# Patient Record
Sex: FEMALE | Race: OTHER | URBAN - METROPOLITAN AREA
[De-identification: names, ages, dates, MRNs, and addresses within clinical notes are randomized per-mention and may not be internally consistent; named-entity substitution may affect disease eponyms.]

---

## 2017-03-16 PROCEDURE — 87510 GARDNER VAG DNA DIR PROBE: CPT | Performed by: FAMILY MEDICINE

## 2017-03-16 PROCEDURE — 87660 TRICHOMONAS VAGIN DIR PROBE: CPT | Performed by: FAMILY MEDICINE

## 2017-03-16 PROCEDURE — 87086 URINE CULTURE/COLONY COUNT: CPT | Performed by: FAMILY MEDICINE

## 2017-03-16 PROCEDURE — 87480 CANDIDA DNA DIR PROBE: CPT | Performed by: FAMILY MEDICINE

## 2019-07-29 PROCEDURE — 88175 CYTOPATH C/V AUTO FLUID REDO: CPT | Performed by: FAMILY MEDICINE

## 2024-06-04 ENCOUNTER — TELEPHONE (OUTPATIENT)
Dept: SURGERY | Facility: CLINIC | Age: 29
End: 2024-06-04

## 2024-06-04 NOTE — TELEPHONE ENCOUNTER
Uploaded MRI Full Ext DOS 05/22/24, MRI SI Joints DOS 05/01/24, IR Fluid Local DOS 11/18/22, CT Abdomen DOS 11/01/22, MRI Spine Lumbar DOS 05/01/24, XR Lumbar Spine DOS 04/15/24, and IR Biopsy DOS 11/18/22 done at [facility]. Placed disc in the patient  drawer for patient .

## 2024-06-06 NOTE — TELEPHONE ENCOUNTER
Rec'd referral with imaging reports and ov notes. Pt has NP appt with Yu Brody on 6/20/2024. Endorsed to nurse's bin for provider review.

## 2024-06-07 NOTE — TELEPHONE ENCOUNTER
referral with imaging reports and ov notes   received by MA clinical staff, endorsed to provider for review. Placed on Yu's desk   Will be sent to scanning once received back from provider.

## 2024-06-14 NOTE — TELEPHONE ENCOUNTER
referral with imaging reports and ov notes  paperwork reviewed and signed by provider, paperwork was sent to scan

## 2024-06-20 ENCOUNTER — OFFICE VISIT (OUTPATIENT)
Dept: SURGERY | Facility: CLINIC | Age: 29
End: 2024-06-20

## 2024-06-20 VITALS
WEIGHT: 180 LBS | SYSTOLIC BLOOD PRESSURE: 124 MMHG | BODY MASS INDEX: 28.93 KG/M2 | DIASTOLIC BLOOD PRESSURE: 84 MMHG | HEART RATE: 90 BPM | HEIGHT: 66 IN

## 2024-06-20 DIAGNOSIS — M51.36 BULGE OF LUMBAR DISC WITHOUT MYELOPATHY: ICD-10-CM

## 2024-06-20 DIAGNOSIS — G57.01 LESION OF RIGHT SCIATIC NERVE: Primary | ICD-10-CM

## 2024-06-20 DIAGNOSIS — M51.36 DDD (DEGENERATIVE DISC DISEASE), LUMBAR: ICD-10-CM

## 2024-06-20 DIAGNOSIS — M79.651 PAIN IN BOTH THIGHS: ICD-10-CM

## 2024-06-20 DIAGNOSIS — M51.36 ANNULAR TEAR OF LUMBAR DISC: ICD-10-CM

## 2024-06-20 DIAGNOSIS — M79.652 PAIN IN BOTH THIGHS: ICD-10-CM

## 2024-06-20 PROCEDURE — 3074F SYST BP LT 130 MM HG: CPT | Performed by: PHYSICIAN ASSISTANT

## 2024-06-20 PROCEDURE — 3079F DIAST BP 80-89 MM HG: CPT | Performed by: PHYSICIAN ASSISTANT

## 2024-06-20 PROCEDURE — 99205 OFFICE O/P NEW HI 60 MIN: CPT | Performed by: PHYSICIAN ASSISTANT

## 2024-06-20 PROCEDURE — 3008F BODY MASS INDEX DOCD: CPT | Performed by: PHYSICIAN ASSISTANT

## 2024-06-20 RX ORDER — TRIAMCINOLONE ACETONIDE 1 MG/G
CREAM TOPICAL
COMMUNITY
Start: 2024-04-17

## 2024-06-20 RX ORDER — MULTIVIT-MIN/IRON/FOLIC ACID/K 18-600-40
CAPSULE ORAL AS DIRECTED
COMMUNITY

## 2024-06-20 NOTE — PROGRESS NOTES
New patient:  Reason for visit: Right side lower back. Numbness and tingling when back pain started.     Mass found sciatic nerve     Estimated time of onset:  first week of April     Numeric Rating Scale:    Pain at Present:  2/10                                                                                                                       Distribution of Pain:    right    Past Treatments for Current Pain Condition:     PT- first 3/4 weeks after pain started- helped at first, not much difference after a couple of sessions.     Steroids/Muscle relaxer's- no relief       Prior diagnostic testing related to this condition:  Uploaded MRI Full Ext DOS 05/22/24, MRI SI Joints DOS 05/01/24

## 2024-06-20 NOTE — PATIENT INSTRUCTIONS
Refill policies:    Allow 2-3 business days for refills; controlled substances may take longer.  Contact your pharmacy at least 5 days prior to running out of medication and have them send an electronic request or submit request through the “request refill” option in your [employer] account.  Refills are not addressed on weekends; covering physicians do not authorize routine medications on weekends.  No narcotics or controlled substances are refilled after noon on Fridays or by on call physicians.  By law, narcotics must be electronically prescribed.  A 30 day supply with no refills is the maximum allowed.  If your prescription is due for a refill, you may be due for a follow up appointment.  To best provide you care, patients receiving routine medications need to be seen at least once a year.  Patients receiving narcotic/controlled substance medications need to be seen at least once every 3 months.  In the event that your preferred pharmacy does not have the requested medication in stock (e.g. Backordered), it is your responsibility to find another pharmacy that has the requested medication available.  We will gladly send a new prescription to that pharmacy at your request.    Scheduling Tests:    If your physician has ordered radiology tests such as MRI or CT scans, please contact Central Scheduling at [phone] right away to schedule the test.  Once scheduled, the [facility] Centralized Referral Team will work with your insurance carrier to obtain pre-certification or prior authorization.  Depending on your insurance carrier, approval may take 3-10 days.  It is highly recommended patients assure they have received an authorization before having a test performed.  If test is done without insurance authorization, patient may be responsible for the entire amount billed.      Precertification and Prior Authorizations:  If your physician has recommended that you have a procedure or additional testing performed the [facility]  Centralized Referral Team will contact your insurance carrier to obtain pre-certification or prior authorization.    You are strongly encouraged to contact your insurance carrier to verify that your procedure/test has been approved and is a COVERED benefit.  Although the [facility] Centralized Referral Team does its due diligence, the insurance carrier gives the disclaimer that \"Although the procedure is authorized, this does not guarantee payment.\"    Ultimately the patient is responsible for payment.   Thank you for your understanding in this matter.  Paperwork Completion:  If you require FMLA or disability paperwork for your recovery, please make sure to either drop it off or have it faxed to our office at [phone]. Be sure the form has your name and date of birth on it.  The form will be faxed to our Forms Department and they will complete it for you.  There is a 25$ fee for all forms that need to be filled out.  Please be aware there is a 10-14 day turnaround time.  You will need to sign a release of information (SIMBA) form if your paperwork does not come with one.  You may call the Forms Department with any questions at [phone].  Their fax number is [phone].

## 2024-06-20 NOTE — H&P
Patient: Carissa Ralph  Medical Record Number: [id number]  YOB: 1995  PCP: Janey Moran DO    Reason for visit: Back pain, right sciatic nerve lesion    Thank you very much for requesting this consultation. I had the opportunity to evaluate and initiate care for your patient today, as per your request.    HISTORY OF CHIEF COMPLAINT:    Carissa Ralph is a very pleasant 28 year old female, with a pertinent PMH of vitamin D deficiency.    Presents today for a complaint of: Back pain, right sciatic nerve lesion  Onset: Symptoms began with back pain in April following a Cross[employer] injury.  States pain in the mid low back with intermittent discomfort to the lower extremities.  Distribution changes, currently she will get occasional discomfort in the tops of both of her thighs.  Initially she did have some discomfort on the right lower extremity.  Prior to this no radiating lower extremity pain, numbness, tingling or weakness.  She did have intermittent numbness and tingling of the right lower extremity following the injury, this resolved.  Her back pain and her lower extremity symptoms have significantly improved since her injury.  She has completed medication therapy as well as physical therapy.  She continues to improve weekly.  She is most concerned about the mass that was found during her workup.  Right sciatic nerve lesion noted.  This is a new, likely incidental diagnosis.  She has not seen pain services or physiatry.  She endorses no neck pain or radiating upper extremity pain or hand numbness, tingling or weakness.  No current lower extremity weakness.  No gait instability.  No bladder/bowel incontinence/retention.    History reviewed. No pertinent past medical history.   Past Surgical History:   Procedure Laterality Date    Cholecystectomy  8-18-11 [facility]    Cholecystitis w/cholelithiasis      Family History   Problem Relation Age of Onset    Diabetes Maternal Grandmother     Hypertension Father      Cancer Maternal Aunt         Breast CA      Social History     Socioeconomic History    Marital status: Single   Tobacco Use    Smoking status: Never    Smokeless tobacco: Never   Substance and Sexual Activity    Alcohol use: Yes     Comment: occasioanlly    Drug use: No    Sexual activity: Yes     Partners: Male     Birth control/protection: Condom      Allergies   Allergen Reactions    Bactrim ANAPHYLAXIS      Current Medications:  Current Outpatient Medications   Medication Sig Dispense Refill    triamcinolone 0.1 % External Cream Apply to AA on body bid prn with flares      Cholecalciferol (VITAMIN D) 50 MCG (2000 UT) Oral Cap Take by mouth As Directed.      ketoconazole 2 % External Cream Apply 1 Application topically 2 (two) times daily. 30 g 1        REVIEW OF SYSTEMS   Comprehensive review of systems done. Negative except what is outlined in the above HPI.     PHYSICAL EXAMIMATION    height is 66\" and weight is 180 lb (81.6 kg). Her blood pressure is 124/84 and her pulse is 90.   GENERAL: Very pleasant patient is in no apparent distress. Sitting comfortably in the examination chair.   HEENT: Normocephalic, atraumatic.  RESPIRATORY RATE: Easy and Even  SKIN: Warm and dry  NEURO: Awake, alert and orientated. Speech fluent, comprehension intact, answering questions appropriately.     SPINE:  Gait/Coordination: Gait deferred  Palpation: Non tender to palpation of midline lumbar spine or the right sciatic notch. + tenderness over the right SI joint region     Upper Extremity Strength:    Deltoid  Biceps  Triceps     Intrinsics      Right 5 5 5 5 5     Left 5 5 5 5 5   Lower Extremity Strength:     Iliopsoas  Hamstrings   Quads    D-flexion P-flexion Great Toe   Right       5         5       5         5 5 5   Left       5         5       5         5 5 5     Tests:   Test Right   (POS or NEG) Left   (POS or NEG)   Dickerson's Sign Neg Neg   Clonus Neg Neg     DATA:   None    IMAGING:   MRI full LE neurography,  RT: 5/2024:  Lesion noted at the sciatic nerve in the pelvic region.     MRI FULL LOW EXT NEUROGRAPHY RT (W+WO)(CPT=72197/41533)    Anatomical Region Laterality Modality   Lower body right Magnetic Resonance     Impression    IMPRESSION:  Spindle-shaped mass involving the right sciatic nerve along the greater sciatic foramen measuring  3.3 x 1.8 cm with microcystic changes and without enhancement. Primary consideration would be a  peripheral nerve sheath tumor such as schwannoma. Neurofibroma could also present in a similar  manner. Clinical correlation is recommended.  Narrative    DATE OF SERVICE: 05.22.2024  MR NEUROGRAPHY OF THE LUMBOSACRAL PLEXUS AND INTRAPELVIC SCIATIC NERVES WITH AND WITHOUT GADOLINIUM    COMPARISON STUDIES:  MRI of the sacroiliac joints dated 5/1/2024.    CLINICAL HISTORY:  Sciatic nerve thickening on the right.    IMAGING PROTOCOL:  Multiplanar multisequence imaging of the lumbosacral plexus and intrapelvic segments of the sciatic  and femoral nerves were obtained on a 3.0 Maria Fernanda magnet utilizing 8.0 cc of gadolinium 2.0 mL was  discarded.    THE LUMBOSACRAL NEURAL PLEXUS AND INTRAPELVIC SCIATIC-FEMORAL NERVES:  The lumbosacral neural plexus appears morphologically intact, without nerve root clumping or  discrete perineural mass lesions, particularly along the sacral foramina.    Fusiform thickening of the right sciatic nerve is noted along the greater sciatic foramen  demonstrating homogeneous enhancement. Microcystic changes are also noted within the fusiform  thickening. Fusiform thickening measures 3.3 x 1.8 cm. Primary consideration would be a peripheral  nerve sheath tumor of the right sciatic nerve. Left-sided nerve is unremarkable.    The piriformis muscles are normal and symmetrical in size, without intramuscular edema, accessory  muscle slips, intramuscular nerve roots, or anomalous muscle origins; additionally, the greater and  lesser sciatic foramina are otherwise morphologically  intact, without discrete soft tissue masses,  large varicosities, or bony exostoses.    The periarticular hip musculature appears intact, without periarticular intramuscular edema,  particularly along the parasymphyseal musculature and the ischiofemoral junctions.    THE PELVIC OSSEOUS STRUCTURES AND ARTICULAR SPACES:  The pelvic bone marrow signal is intact, without diffuse marrow signal replacement or discrete focal  marrow lesions.      The sacroiliac joints maintain normal alignment, without synovial thickening, discrete periarticular  osseous erosions, subchondral sclerosis or fatty infiltration, or reactive osteitis-like subchondral  bone marrow edema; additionally, no inflammatory sacroiliac capsulitis or periarticular enthesopathy  are present.    THE INTRAPELVIC ORGANS:  The urinary bladder is distensible, without definite wall thickening or discrete intraluminal  filling defects.  No pathologically enlarged intrapelvic lymph nodes are present.  No pelvic ascites is present.  Procedure Note    Carolyn Allen MD - 05/23/2024  Formatting of this note might be different from the original.  DATE OF SERVICE: 05.22.2024  MR NEUROGRAPHY OF THE LUMBOSACRAL PLEXUS AND INTRAPELVIC SCIATIC NERVES WITH AND WITHOUT GADOLINIUM    COMPARISON STUDIES:  MRI of the sacroiliac joints dated 5/1/2024.    CLINICAL HISTORY:  Sciatic nerve thickening on the right.    IMAGING PROTOCOL:  Multiplanar multisequence imaging of the lumbosacral plexus and intrapelvic segments of the sciatic  and femoral nerves were obtained on a 3.0 Maria Fernanda magnet utilizing 8.0 cc of gadolinium 2.0 mL was  discarded.    THE LUMBOSACRAL NEURAL PLEXUS AND INTRAPELVIC SCIATIC-FEMORAL NERVES:  The lumbosacral neural plexus appears morphologically intact, without nerve root clumping or  discrete perineural mass lesions, particularly along the sacral foramina.    Fusiform thickening of the right sciatic nerve is noted along the greater sciatic  foramen  demonstrating homogeneous enhancement. Microcystic changes are also noted within the fusiform  thickening. Fusiform thickening measures 3.3 x 1.8 cm. Primary consideration would be a peripheral  nerve sheath tumor of the right sciatic nerve. Left-sided nerve is unremarkable.    The piriformis muscles are normal and symmetrical in size, without intramuscular edema, accessory  muscle slips, intramuscular nerve roots, or anomalous muscle origins; additionally, the greater and  lesser sciatic foramina are otherwise morphologically intact, without discrete soft tissue masses,  large varicosities, or bony exostoses.    The periarticular hip musculature appears intact, without periarticular intramuscular edema,  particularly along the parasymphyseal musculature and the ischiofemoral junctions.    THE PELVIC OSSEOUS STRUCTURES AND ARTICULAR SPACES:  The pelvic bone marrow signal is intact, without diffuse marrow signal replacement or discrete focal  marrow lesions.      The sacroiliac joints maintain normal alignment, without synovial thickening, discrete periarticular  osseous erosions, subchondral sclerosis or fatty infiltration, or reactive osteitis-like subchondral  bone marrow edema; additionally, no inflammatory sacroiliac capsulitis or periarticular enthesopathy  are present.    THE INTRAPELVIC ORGANS:  The urinary bladder is distensible, without definite wall thickening or discrete intraluminal  filling defects.  No pathologically enlarged intrapelvic lymph nodes are present.  No pelvic ascites is present.    =====  IMPRESSION:  Spindle-shaped mass involving the right sciatic nerve along the greater sciatic foramen measuring  3.3 x 1.8 cm with microcystic changes and without enhancement. Primary consideration would be a  peripheral nerve sheath tumor such as schwannoma. Neurofibroma could also present in a similar  manner. Clinical correlation is recommended.  Exam End: 05/22/24  3:15 PM    Specimen  Collected: 05/23/24 10:30 AM Last Resulted: 05/23/24 11:02 AM   Received From: [facility]  Result Received: 06/14/24  3:50 PM       MRI lumbar spine, 5/2024:  Lumbar DDD, mild, most prominent at L5-S1 with annular tear and broad based disc bulge without spinal stenosis   MRI SPINE LUMBAR (CPT=72148)    Anatomical Region Laterality Modality   L-spine -- Magnetic Resonance     Impression    IMPRESSION:  1.  No lumbar disc extrusion or specific nerve root compression.  2.  No MR signs of inflammatory lumbar spondyloarthropathy.  3.  No discrete pars defects or stress-induced pedicular marrow edema.  Narrative    DATE OF SERVICE: 05.01.2024  MRI OF THE LUMBAR SPINE, WITHOUT CONTRAST, 5/1/2024.    COMPARISON STUDIES:  Lumbar spine radiographic series, 4/15/2024.    CLINICAL HISTORY:  Lumbar radiculopathy.    IMAGING PROTOCOL:  Routine nonenhanced multiplanar MR sequences of the lumbar spine were obtained, utilizing a closed  1.5-Maria Fernanda multichannel MR system.    THE LUMBAR VERTEBRA AND PARAVERTEBRAL SOFT TISSUES:  The previous comparison radiographs demonstrate that 5 nonrib-bearing lumbar-type vertebra are  present, along with hypoplastic T12 ribs; therefore, the lowermost complete intervertebral disc will  be considered L5-S1.    The lumbar vertebrae maintain normal heights, without acute fractures or compression deformities.  The vertebral bone marrow signal is normal, without inflammatory marrow edema, diffuse marrow signal  replacement, or focal destructive marrow lesions.  No osteitis-like bone marrow edema or eccentric syndesmophyte formation are present along the lumbar  vertebral corners, subjacent to the longitudinal ligaments; additionally, no bony ankylosis or  pseudoarthrosis formation are detected along the posterior spinal elements.  No discrete pars defects or stress-induced pedicular marrow edema are present.    The paravertebral soft tissues are unremarkable, without paravertebral soft tissue  edema or  pathologically enlarged paravertebral retroperitoneal lymph nodes.  The paravertebral musculature appears morphologically intact, without intramuscular interstitial  edema or atrophic fatty replacement.    THE CONUS AND SACRAL NERVE ROOTS:  The conus normally tapers at T12-L1, without discrete focal intramedullary T2 signal abnormalities;  the sacral nerve roots are normal in caliber and signal, without peripheral tethering or nodular  clumping.    THE LUMBAR INTERVERTEBRAL DISCS AND FACET JOINTS:  The lumbar intervertebral discs are normal in height and signal; no lumbar disc extrusion, specific  nerve root compression, or edematous neural swelling are present.  No central canal stenosis is present.  No facetal arthrosis, synovial thickening, subchondral bone marrow edema, perifacetal synovial  cysts, or foraminal stenosis are present.  Procedure Note    Samuel Fleming MD - 05/01/2024  Formatting of this note might be different from the original.  DATE OF SERVICE: 05.01.2024  MRI OF THE LUMBAR SPINE, WITHOUT CONTRAST, 5/1/2024.    COMPARISON STUDIES:  Lumbar spine radiographic series, 4/15/2024.    CLINICAL HISTORY:  Lumbar radiculopathy.    IMAGING PROTOCOL:  Routine nonenhanced multiplanar MR sequences of the lumbar spine were obtained, utilizing a closed  1.5-Maria Fernanda multichannel MR system.    THE LUMBAR VERTEBRA AND PARAVERTEBRAL SOFT TISSUES:  The previous comparison radiographs demonstrate that 5 nonrib-bearing lumbar-type vertebra are  present, along with hypoplastic T12 ribs; therefore, the lowermost complete intervertebral disc will  be considered L5-S1.    The lumbar vertebrae maintain normal heights, without acute fractures or compression deformities.  The vertebral bone marrow signal is normal, without inflammatory marrow edema, diffuse marrow signal  replacement, or focal destructive marrow lesions.  No osteitis-like bone marrow edema or eccentric syndesmophyte formation are present along the  lumbar  vertebral corners, subjacent to the longitudinal ligaments; additionally, no bony ankylosis or  pseudoarthrosis formation are detected along the posterior spinal elements.  No discrete pars defects or stress-induced pedicular marrow edema are present.    The paravertebral soft tissues are unremarkable, without paravertebral soft tissue edema or  pathologically enlarged paravertebral retroperitoneal lymph nodes.  The paravertebral musculature appears morphologically intact, without intramuscular interstitial  edema or atrophic fatty replacement.    THE CONUS AND SACRAL NERVE ROOTS:  The conus normally tapers at T12-L1, without discrete focal intramedullary T2 signal abnormalities;  the sacral nerve roots are normal in caliber and signal, without peripheral tethering or nodular  clumping.    THE LUMBAR INTERVERTEBRAL DISCS AND FACET JOINTS:  The lumbar intervertebral discs are normal in height and signal; no lumbar disc extrusion, specific  nerve root compression, or edematous neural swelling are present.  No central canal stenosis is present.  No facetal arthrosis, synovial thickening, subchondral bone marrow edema, perifacetal synovial  cysts, or foraminal stenosis are present.    =====  IMPRESSION:  1.  No lumbar disc extrusion or specific nerve root compression.  2.  No MR signs of inflammatory lumbar spondyloarthropathy.  3.  No discrete pars defects or stress-induced pedicular marrow edema.  Exam End: 05/01/24 12:56 PM    Specimen Collected: 05/01/24  3:46 PM Last Resulted: 05/01/24  4:07 PM   Received From: [facility]  Result Received: 06/14/24  3:50 PM     MEDICAL DECISION MAKING:     ASSESSMENT and PLAN:    ICD-10-CM    1. Lesion of right sciatic nerve  G57.01       2. Annular tear of lumbar disc  M51.36       3. Bulge of lumbar disc without myelopathy  M51.36       4. Pain in both thighs  M79.651     M79.652       5. DDD (degenerative disc disease), lumbar  M51.36         PLAN:   1.  Medication: None prescribed  2. Imaging:    - Reviewed today:    -MRI lumbar spine and MRI neurography right lower extremity:     -Please see above imaging section for report and review   - Ordered today:    -MRI RLE w + wo:     - 3 month interval scan to monitor sciatic nerve lesion, RT  3. Activity:    - Continue PT/HEP   - Encourage core/spine strengthening   - Encourage good posture-  4. Follow up with Dr. Blair end of August to follow neuro-oncology conference for imaging review or call or follow up sooner or go to the ED for any new, worsening or concerning signs or symptoms    -This is a pleasant 28-year-old female who presents today for mass of the right sciatic nerve.  This is likely an incidental finding.  Her low back pain and lower extremity symptoms began April following a Cross[employer] injury.  Prior to this no back pain or lower extremity pain, numbness, tingling or weakness.  Her symptoms have been gradually improving since her injury.  She has no right lower extremity pain in a sciatic nerve distribution.  She has discomfort intermittently of the anterior bilateral thighs.  No current lower extremity numbness, tingling or weakness.  No gait stability.  No bladder/bowel incontinence/retention.  Had a lengthy discussion with the patient regarding plan of care going forward, including surgical intervention versus monitoring lesion with interval imaging in 3 months.  Patient would like to proceed with a 3-month interval scan.  MRI order placed.  Encouraged her to drop off imaging at least 1 week prior to the appointment.    Dr. Blair and I reviewed imaging and discussed the plan. Dr. Carter agrees with the plan. Dr. Carter and I reviewed imaging and discussed the plan with the patient. The patient agrees with the plan, verbalized understanding and is appreciative. All questions were sought out and thoroughly answered to satisfaction.       Total visit time: 60 minutes  More than 50% spent coordinating  care, providing patient education, reviewing imaging, discussing further imaging, discussing activity and counseling.    Thank you very much for the kind referral.  Respectfully yours,    Yu Brody M.S., LEONARDO  [address], [address] [address]  [phone]  6/20/2024 9:33 AM    Dragon speech recognition software was used to prepare this note. If a word or phrase is confusing, it is likely due to a failure of recognition. Please contact me with any questions or clarifications.

## 2024-08-21 ENCOUNTER — TELEPHONE (OUTPATIENT)
Dept: SURGERY | Facility: CLINIC | Age: 29
End: 2024-08-21

## 2024-08-21 ENCOUNTER — PATIENT MESSAGE (OUTPATIENT)
Dept: SURGERY | Facility: CLINIC | Age: 29
End: 2024-08-21

## 2024-08-21 NOTE — TELEPHONE ENCOUNTER
From: Carissa Ralph  To: Yu Brody  Sent: 8/21/2024 9:31 AM CDT  Subject: MRI disk/ Results - Appointment 8/27    Morning Dr. Brody -     Yesterday I dropped off a disk with results of my MRI from yesterday. I did the MRI at [facility] and received the results today, but in reading the report, I don't see anything regarding the sciatic nerve/lesion that was discovered a few months ago. Would you be able to tell me from the disk if the MRI completed was the correct one?  I have an appointment on 8/27 with Dr. Blair.   I have reviewed and confirmed nurses' notes...

## 2024-08-21 NOTE — TELEPHONE ENCOUNTER
Message below noted.     Imaging placed in bin today.     Provider not in office currently.     LOV 6.20.24  \"PLAN:   1. Medication: None prescribed  2. Imaging:                 - Reviewed today:                              -MRI lumbar spine and MRI neurography right lower extremity:                                            -Please see above imaging section for report and review                - Ordered today:                              -MRI RLE w + wo:                                            - 3 month interval scan to monitor sciatic nerve lesion, RT  3. Activity:                 - Continue PT/HEP                - Encourage core/spine strengthening                - Encourage good posture-  4. Follow up with Dr. Blair end of August to follow neuro-oncology conference for imaging review or call or follow up sooner or go to the ED for any new, worsening or concerning signs or symptoms                 -This is a pleasant 28-year-old female who presents today for mass of the right sciatic nerve.  This is likely an incidental finding.  Her low back pain and lower extremity symptoms began April following a Cross[employer] injury.  Prior to this no back pain or lower extremity pain, numbness, tingling or weakness.  Her symptoms have been gradually improving since her injury.  She has no right lower extremity pain in a sciatic nerve distribution.  She has discomfort intermittently of the anterior bilateral thighs.  No current lower extremity numbness, tingling or weakness.  No gait stability.  No bladder/bowel incontinence/retention.  Had a lengthy discussion with the patient regarding plan of care going forward, including surgical intervention versus monitoring lesion with interval imaging in 3 months.  Patient would like to proceed with a 3-month interval scan.  MRI order placed.  Encouraged her to drop off imaging at least 1 week prior to the appointment.\"    Routed to Physician.

## 2024-08-21 NOTE — TELEPHONE ENCOUNTER
Received MRI lower right leg dos 8-20-24 from [facility] and put in  [place] . Uploaded and was duplicate of 5-22-24 and patient aware of error and will reach out to get correct disc. Universal management shows 5-22-24 was uploaded successfully at last visit;

## 2024-08-22 NOTE — TELEPHONE ENCOUNTER
Dr. Blair reviewed imaging. The images do not show the sciatic nerve lesion. [facility] did not do the order for the MRI with what was in the comment.     Called and spoke to patient to explain the above. Patient has called [facility] already and they did note ordering the wrong MRI. The correct MRI has been ordered through [facility]. Patient unable to get appointment before her 8/27/24 neuro onc appointment. She is on a wait list and keeps calling them to try and get a sooner appointment. Advised we will keep the 8/27/24 appointment for now and patient will call us on Monday to reschedule if she cannot get a sooner MRI appointment. Patient appreciative of phone call.

## 2024-08-27 ENCOUNTER — APPOINTMENT (OUTPATIENT)
Dept: HEMATOLOGY/ONCOLOGY | Facility: HOSPITAL | Age: 29
End: 2024-08-27
Attending: NEUROLOGICAL SURGERY
Payer: COMMERCIAL

## 2024-08-27 NOTE — TELEPHONE ENCOUNTER
Message below noted.    Appointment scheduled for 9.10 in Neuro Onc Clinic.     LOV 6.20.24  \"PLAN:   1. Medication: None prescribed  2. Imaging:                 - Reviewed today:                              -MRI lumbar spine and MRI neurography right lower extremity:                                            -Please see above imaging section for report and review                - Ordered today:                              -MRI RLE w + wo:                                            - 3 month interval scan to monitor sciatic nerve lesion, RT  3. Activity:                 - Continue PT/HEP                - Encourage core/spine strengthening                - Encourage good posture-  4. Follow up with Dr. Blair end of August to follow neuro-oncology conference for imaging review or call or follow up sooner or go to the ED for any new, worsening or concerning signs or symptoms                 -This is a pleasant 28-year-old female who presents today for mass of the right sciatic nerve.  This is likely an incidental finding.  Her low back pain and lower extremity symptoms began April following a Cross[employer] injury.  Prior to this no back pain or lower extremity pain, numbness, tingling or weakness.  Her symptoms have been gradually improving since her injury.  She has no right lower extremity pain in a sciatic nerve distribution.  She has discomfort intermittently of the anterior bilateral thighs.  No current lower extremity numbness, tingling or weakness.  No gait stability.  No bladder/bowel incontinence/retention.  Had a lengthy discussion with the patient regarding plan of care going forward, including surgical intervention versus monitoring lesion with interval imaging in 3 months.  Patient would like to proceed with a 3-month interval scan.  MRI order placed.  Encouraged her to drop off imaging at least 1 week prior to the appointment.     Dr. Blair and I reviewed imaging and discussed the plan. Dr. Carter  agrees with the plan. Dr. Carter and I reviewed imaging and discussed the plan with the patient. The patient agrees with the plan, verbalized understanding and is appreciative. All questions were sought out and thoroughly answered to satisfaction.\"    Routed to Provider.

## 2024-08-27 NOTE — TELEPHONE ENCOUNTER
Pt dropped off imaging from [facility], dos 8/26/2024 (mri full lower ext neurograph?)and 8/20/2024(mri lower leg)uploaded to PACS, confirmed in univ manager. Disc placed in pt  drawer. Pt has f/u appt 9/10/2024 in neuro Onc Clinic, pt ok with 9/10 unless provider prefers to see her at clinic sooner.

## 2024-09-10 ENCOUNTER — OFFICE VISIT (OUTPATIENT)
Dept: NEUROLOGY | Facility: CLINIC | Age: 29
End: 2024-09-10
Payer: COMMERCIAL

## 2024-09-10 ENCOUNTER — NURSE ONLY (OUTPATIENT)
Dept: HEMATOLOGY/ONCOLOGY | Facility: HOSPITAL | Age: 29
End: 2024-09-10
Attending: NEUROLOGICAL SURGERY
Payer: COMMERCIAL

## 2024-09-10 VITALS
WEIGHT: 185 LBS | RESPIRATION RATE: 18 BRPM | SYSTOLIC BLOOD PRESSURE: 133 MMHG | TEMPERATURE: 98 F | OXYGEN SATURATION: 99 % | DIASTOLIC BLOOD PRESSURE: 83 MMHG | HEART RATE: 82 BPM | BODY MASS INDEX: 30 KG/M2

## 2024-09-10 DIAGNOSIS — G57.01 SCIATIC NERVE LESION, RIGHT: Primary | ICD-10-CM

## 2024-09-10 PROCEDURE — 99213 OFFICE O/P EST LOW 20 MIN: CPT | Performed by: NEUROLOGICAL SURGERY

## 2024-09-10 PROCEDURE — 99211 OFF/OP EST MAY X REQ PHY/QHP: CPT

## 2024-09-10 NOTE — PROGRESS NOTES
Neurosurgery Clinic Visit  9/10/2024    Carissa Ralph PCP:  Janey Moran DO    1995 MRN [id number]     HISTORY OF PRESENT ILLNESS:  Carissa Ralph is a(n) 29 year old female presents for follow-up regarding right sciatic lesion  She is doing well  She has no new issues  She is here for follow-up      PHYSICAL EXAMINATION:  Vital Signs:  There were no vitals taken for this visit.  Awake and alert, orient x 3  Follows commands x 4  Good strength MRI reviewed at tumor board which shows right      REVIEW OF STUDIES:    Sciatic nerve lesion which looks slightly smaller      ASSESSMENT and PLAN:  29-year-old female with right sciatic lesion  Discussed several different pathologies including schwannoma, endometriosis  She has follow-up with her OB/GYN He  Will see her back in 9 months with repeat MRI  This was discussed at tumor board and the plan was made  Reviewed films in detail  All questions were answered  Patient appreciative        Time spent on counseling/coordination of care:  15 Minutes    Total time spent with patient:  20 minutes      Mike Blair MD   [facility]  9/10/2024  2:28 PM   Not proofread

## 2024-12-10 RX ORDER — PHENAZOPYRIDINE HYDROCHLORIDE 100 MG/1
100 TABLET, FILM COATED ORAL ONCE
Status: CANCELLED | OUTPATIENT
Start: 2024-12-10 | End: 2024-12-10

## 2024-12-24 NOTE — H&P
[facility]   part of [facility]    History and Physical    Carissa Ralph Patient Status:  Hospital Outpatient Surgery    1995 MRN [id number]   Location [facility] SURGERY Attending Rosmery Parry MD   Hosp Day # 0 PCP Janey Moran DO     Date:  2024  Date of Admission:  (Not on file)    History provided by:patient  HPI:   No chief complaint on file.    Patient is a 30 yo with a uterine septum and endometriosis here for surgical intervention.         History     Past Medical History:    Hx of motion sickness    Visual impairment    glasses and contacts     Past Surgical History:   Procedure Laterality Date    Cholecystectomy  11 [facility]    Cholecystitis w/cholelithiasis     Family History   Problem Relation Age of Onset    Diabetes Maternal Grandmother     Hypertension Father     Cancer Maternal Aunt         Breast CA     Social History:  Social History     Socioeconomic History    Marital status: Single   Tobacco Use    Smoking status: Never    Smokeless tobacco: Never   Substance and Sexual Activity    Alcohol use: Yes     Comment: occasioanlly    Drug use: No    Sexual activity: Yes     Partners: Male     Birth control/protection: Condom     Allergies/Medications:   Allergies: Allergies[1]  No medications prior to admission.       Review of Systems:   Review of Systems    Physical Exam:   Vital Signs:  Height 5' 6\" (1.676 m), weight 185 lb (83.9 kg), last menstrual period 10/24/2024, not currently breastfeeding.  Physical Exam  Constitutional:       Appearance: Normal appearance. She is normal weight.   HENT:      Head: Normocephalic and atraumatic.   Cardiovascular:      Rate and Rhythm: Normal rate.      Pulses: Normal pulses.   Pulmonary:      Effort: Pulmonary effort is normal.   Neurological:      General: No focal deficit present.      Mental Status: She is alert and oriented to person, place, and time. Mental status is at baseline.   Psychiatric:         Mood and  Affect: Mood normal.         Behavior: Behavior normal.         Thought Content: Thought content normal.         Judgment: Judgment normal.       Cervical Papanicolaou done within 1 year of adm    Results:     Lab Results   Component Value Date    WBC 4.26 04/05/2022    HGB 11.7 (L) 04/05/2022    HCT 39.2 04/05/2022     04/05/2022    CREATSERUM 0.77 12/28/2021    BUN 11.0 12/28/2021     12/28/2021    K 4.48 12/28/2021     12/28/2021    CO2 24.1 12/28/2021     12/28/2021    CA 9.6 12/28/2021    ALB 4.7 12/28/2021    ALKPHO 97 12/28/2021    BILT 0.31 12/28/2021    TP 7.7 12/28/2021    AST 24 12/28/2021    ALT 39 (H) 12/28/2021    T4F 1.10 06/07/2016    TSH 0.995 12/28/2021    LIP 44 12/28/2021    B12 396 04/05/2022     No results found.        Assessment/Plan:     * No active hospital problems. *  Plan hysteroscopy transection of uterine septum, laparoscopy tubal lavage, possible left salpingectomy, excision of endometriosis, cystoscopy, placement of uterine stent    Ancef 2g  SCDs            Rosmery Parry MD  12/24/2024         [1]   Allergies  Allergen Reactions    Bactrim ANAPHYLAXIS

## 2024-12-26 ENCOUNTER — HOSPITAL ENCOUNTER (OUTPATIENT)
Facility: HOSPITAL | Age: 29
Setting detail: HOSPITAL OUTPATIENT SURGERY
Discharge: HOME | End: 2024-12-26
Attending: OBSTETRICS & GYNECOLOGY | Admitting: OBSTETRICS & GYNECOLOGY
Payer: COMMERCIAL

## 2024-12-26 ENCOUNTER — ANESTHESIA EVENT (OUTPATIENT)
Dept: SURGERY | Facility: HOSPITAL | Age: 29
End: 2024-12-26
Payer: COMMERCIAL

## 2024-12-26 ENCOUNTER — ANESTHESIA (OUTPATIENT)
Dept: SURGERY | Facility: HOSPITAL | Age: 29
End: 2024-12-26
Payer: COMMERCIAL

## 2024-12-26 VITALS
RESPIRATION RATE: 18 BRPM | SYSTOLIC BLOOD PRESSURE: 130 MMHG | TEMPERATURE: 97 F | HEART RATE: 72 BPM | OXYGEN SATURATION: 98 % | HEIGHT: 66 IN | BODY MASS INDEX: 30.31 KG/M2 | WEIGHT: 188.63 LBS | DIASTOLIC BLOOD PRESSURE: 78 MMHG

## 2024-12-26 DIAGNOSIS — G89.18 ACUTE POST-OPERATIVE PAIN: Primary | ICD-10-CM

## 2024-12-26 PROBLEM — N80.9 ENDOMETRIOSIS: Status: ACTIVE | Noted: 2024-12-26

## 2024-12-26 LAB
B-HCG UR QL: NEGATIVE
HBV SURFACE AG SER-ACNC: <0.1 [IU]/L
HBV SURFACE AG SERPL QL IA: NONREACTIVE
HCV AB SERPL QL IA: NONREACTIVE
HIV 1+2 AB+HIV1 P24 AG SERPL QL IA: NONREACTIVE

## 2024-12-26 PROCEDURE — 87340 HEPATITIS B SURFACE AG IA: CPT | Performed by: OBSTETRICS & GYNECOLOGY

## 2024-12-26 PROCEDURE — 0DBW4ZZ EXCISION OF PERITONEUM, PERCUTANEOUS ENDOSCOPIC APPROACH: ICD-10-PCS | Performed by: OBSTETRICS & GYNECOLOGY

## 2024-12-26 PROCEDURE — 0U798DZ DILATION OF UTERUS WITH INTRALUMINAL DEVICE, VIA NATURAL OR ARTIFICIAL OPENING ENDOSCOPIC: ICD-10-PCS | Performed by: OBSTETRICS & GYNECOLOGY

## 2024-12-26 PROCEDURE — 88304 TISSUE EXAM BY PATHOLOGIST: CPT | Performed by: OBSTETRICS & GYNECOLOGY

## 2024-12-26 PROCEDURE — 3E1P78Z IRRIGATION OF FEMALE REPRODUCTIVE USING IRRIGATING SUBSTANCE, VIA NATURAL OR ARTIFICIAL OPENING: ICD-10-PCS | Performed by: OBSTETRICS & GYNECOLOGY

## 2024-12-26 PROCEDURE — 0UN98ZZ RELEASE UTERUS, VIA NATURAL OR ARTIFICIAL OPENING ENDOSCOPIC: ICD-10-PCS | Performed by: OBSTETRICS & GYNECOLOGY

## 2024-12-26 PROCEDURE — 81025 URINE PREGNANCY TEST: CPT

## 2024-12-26 PROCEDURE — 86701 HIV-1ANTIBODY: CPT | Performed by: OBSTETRICS & GYNECOLOGY

## 2024-12-26 PROCEDURE — 86803 HEPATITIS C AB TEST: CPT | Performed by: OBSTETRICS & GYNECOLOGY

## 2024-12-26 RX ORDER — OXYCODONE HYDROCHLORIDE 5 MG/1
10 TABLET ORAL ONCE AS NEEDED
Status: COMPLETED | OUTPATIENT
Start: 2024-12-26 | End: 2024-12-26

## 2024-12-26 RX ORDER — LIDOCAINE HYDROCHLORIDE 40 MG/ML
INJECTION, SOLUTION RETROBULBAR AS NEEDED
Status: DISCONTINUED | OUTPATIENT
Start: 2024-12-26 | End: 2024-12-26 | Stop reason: SURG

## 2024-12-26 RX ORDER — ESTRADIOL 2 MG/1
2 TABLET ORAL 2 TIMES DAILY
Qty: 60 TABLET | Refills: 0 | Status: SHIPPED | OUTPATIENT
Start: 2024-12-26

## 2024-12-26 RX ORDER — PROGESTERONE 100 MG/1
CAPSULE ORAL
Qty: 5 CAPSULE | Refills: 0 | Status: SHIPPED | OUTPATIENT
Start: 2024-12-26

## 2024-12-26 RX ORDER — PROCHLORPERAZINE EDISYLATE 5 MG/ML
5 INJECTION INTRAMUSCULAR; INTRAVENOUS EVERY 8 HOURS PRN
Status: DISCONTINUED | OUTPATIENT
Start: 2024-12-26 | End: 2024-12-26

## 2024-12-26 RX ORDER — ONDANSETRON 8 MG/1
8 TABLET, ORALLY DISINTEGRATING ORAL EVERY 4 HOURS PRN
Qty: 10 TABLET | Refills: 0 | Status: SHIPPED | OUTPATIENT
Start: 2024-12-26

## 2024-12-26 RX ORDER — HYDROMORPHONE HYDROCHLORIDE 1 MG/ML
0.6 INJECTION, SOLUTION INTRAMUSCULAR; INTRAVENOUS; SUBCUTANEOUS EVERY 5 MIN PRN
Status: DISCONTINUED | OUTPATIENT
Start: 2024-12-26 | End: 2024-12-26

## 2024-12-26 RX ORDER — SCOLOPAMINE TRANSDERMAL SYSTEM 1 MG/1
1 PATCH, EXTENDED RELEASE TRANSDERMAL ONCE
Status: COMPLETED | OUTPATIENT
Start: 2024-12-26 | End: 2024-12-26

## 2024-12-26 RX ORDER — ROCURONIUM BROMIDE 10 MG/ML
INJECTION, SOLUTION INTRAVENOUS AS NEEDED
Status: DISCONTINUED | OUTPATIENT
Start: 2024-12-26 | End: 2024-12-26 | Stop reason: SURG

## 2024-12-26 RX ORDER — LIDOCAINE HYDROCHLORIDE 10 MG/ML
INJECTION, SOLUTION EPIDURAL; INFILTRATION; INTRACAUDAL; PERINEURAL AS NEEDED
Status: DISCONTINUED | OUTPATIENT
Start: 2024-12-26 | End: 2024-12-26 | Stop reason: SURG

## 2024-12-26 RX ORDER — SENNA AND DOCUSATE SODIUM 50; 8.6 MG/1; MG/1
1 TABLET, FILM COATED ORAL DAILY PRN
Qty: 30 TABLET | Refills: 0 | Status: SHIPPED | OUTPATIENT
Start: 2024-12-26

## 2024-12-26 RX ORDER — KETOROLAC TROMETHAMINE 30 MG/ML
INJECTION, SOLUTION INTRAMUSCULAR; INTRAVENOUS AS NEEDED
Status: DISCONTINUED | OUTPATIENT
Start: 2024-12-26 | End: 2024-12-26 | Stop reason: SURG

## 2024-12-26 RX ORDER — DEXAMETHASONE SODIUM PHOSPHATE 4 MG/ML
VIAL (ML) INJECTION AS NEEDED
Status: DISCONTINUED | OUTPATIENT
Start: 2024-12-26 | End: 2024-12-26 | Stop reason: SURG

## 2024-12-26 RX ORDER — PHENAZOPYRIDINE HYDROCHLORIDE 200 MG/1
200 TABLET, FILM COATED ORAL ONCE
Status: COMPLETED | OUTPATIENT
Start: 2024-12-26 | End: 2024-12-26

## 2024-12-26 RX ORDER — IBUPROFEN 600 MG/1
600 TABLET, FILM COATED ORAL EVERY 8 HOURS PRN
Qty: 30 TABLET | Refills: 0 | Status: SHIPPED | OUTPATIENT
Start: 2024-12-26

## 2024-12-26 RX ORDER — HYDROMORPHONE HYDROCHLORIDE 1 MG/ML
0.2 INJECTION, SOLUTION INTRAMUSCULAR; INTRAVENOUS; SUBCUTANEOUS EVERY 5 MIN PRN
Status: DISCONTINUED | OUTPATIENT
Start: 2024-12-26 | End: 2024-12-26

## 2024-12-26 RX ORDER — ONDANSETRON 2 MG/ML
INJECTION INTRAMUSCULAR; INTRAVENOUS AS NEEDED
Status: DISCONTINUED | OUTPATIENT
Start: 2024-12-26 | End: 2024-12-26 | Stop reason: SURG

## 2024-12-26 RX ORDER — ACETAMINOPHEN 500 MG
1000 TABLET ORAL ONCE
Status: DISCONTINUED | OUTPATIENT
Start: 2024-12-26 | End: 2024-12-26 | Stop reason: HOSPADM

## 2024-12-26 RX ORDER — ACETAMINOPHEN 10 MG/ML
INJECTION, SOLUTION INTRAVENOUS AS NEEDED
Status: DISCONTINUED | OUTPATIENT
Start: 2024-12-26 | End: 2024-12-26 | Stop reason: SURG

## 2024-12-26 RX ORDER — MIDAZOLAM HYDROCHLORIDE 1 MG/ML
1 INJECTION INTRAMUSCULAR; INTRAVENOUS EVERY 5 MIN PRN
Status: DISCONTINUED | OUTPATIENT
Start: 2024-12-26 | End: 2024-12-26

## 2024-12-26 RX ORDER — HYDROMORPHONE HYDROCHLORIDE 1 MG/ML
INJECTION, SOLUTION INTRAMUSCULAR; INTRAVENOUS; SUBCUTANEOUS
Status: COMPLETED
Start: 2024-12-26 | End: 2024-12-26

## 2024-12-26 RX ORDER — HYDROMORPHONE HYDROCHLORIDE 1 MG/ML
0.4 INJECTION, SOLUTION INTRAMUSCULAR; INTRAVENOUS; SUBCUTANEOUS EVERY 5 MIN PRN
Status: DISCONTINUED | OUTPATIENT
Start: 2024-12-26 | End: 2024-12-26

## 2024-12-26 RX ORDER — BUPIVACAINE HYDROCHLORIDE 5 MG/ML
INJECTION, SOLUTION EPIDURAL; INTRACAUDAL AS NEEDED
Status: DISCONTINUED | OUTPATIENT
Start: 2024-12-26 | End: 2024-12-26 | Stop reason: HOSPADM

## 2024-12-26 RX ORDER — SODIUM CHLORIDE, SODIUM LACTATE, POTASSIUM CHLORIDE, CALCIUM CHLORIDE 600; 310; 30; 20 MG/100ML; MG/100ML; MG/100ML; MG/100ML
INJECTION, SOLUTION INTRAVENOUS CONTINUOUS
Status: DISCONTINUED | OUTPATIENT
Start: 2024-12-26 | End: 2024-12-26

## 2024-12-26 RX ORDER — NALOXONE HYDROCHLORIDE 0.4 MG/ML
0.08 INJECTION, SOLUTION INTRAMUSCULAR; INTRAVENOUS; SUBCUTANEOUS AS NEEDED
Status: DISCONTINUED | OUTPATIENT
Start: 2024-12-26 | End: 2024-12-26

## 2024-12-26 RX ORDER — DIPHENHYDRAMINE HYDROCHLORIDE 50 MG/ML
12.5 INJECTION INTRAMUSCULAR; INTRAVENOUS AS NEEDED
Status: DISCONTINUED | OUTPATIENT
Start: 2024-12-26 | End: 2024-12-26

## 2024-12-26 RX ORDER — HYDROCODONE BITARTRATE AND ACETAMINOPHEN 5; 325 MG/1; MG/1
1-2 TABLET ORAL EVERY 4 HOURS PRN
Qty: 12 TABLET | Refills: 0 | Status: SHIPPED | OUTPATIENT
Start: 2024-12-26

## 2024-12-26 RX ORDER — OXYCODONE HYDROCHLORIDE 5 MG/1
5 TABLET ORAL ONCE AS NEEDED
Status: COMPLETED | OUTPATIENT
Start: 2024-12-26 | End: 2024-12-26

## 2024-12-26 RX ORDER — EPHEDRINE SULFATE 50 MG/ML
INJECTION INTRAVENOUS AS NEEDED
Status: DISCONTINUED | OUTPATIENT
Start: 2024-12-26 | End: 2024-12-26 | Stop reason: SURG

## 2024-12-26 RX ORDER — MEPERIDINE HYDROCHLORIDE 25 MG/ML
12.5 INJECTION INTRAMUSCULAR; INTRAVENOUS; SUBCUTANEOUS AS NEEDED
Status: DISCONTINUED | OUTPATIENT
Start: 2024-12-26 | End: 2024-12-26

## 2024-12-26 RX ORDER — ONDANSETRON 2 MG/ML
4 INJECTION INTRAMUSCULAR; INTRAVENOUS EVERY 6 HOURS PRN
Status: DISCONTINUED | OUTPATIENT
Start: 2024-12-26 | End: 2024-12-26

## 2024-12-26 RX ADMIN — EPHEDRINE SULFATE 5 MG: 50 INJECTION INTRAVENOUS at 13:00:00

## 2024-12-26 RX ADMIN — ROCURONIUM BROMIDE 50 MG: 10 INJECTION, SOLUTION INTRAVENOUS at 12:40:00

## 2024-12-26 RX ADMIN — DEXAMETHASONE SODIUM PHOSPHATE 8 MG: 4 MG/ML VIAL (ML) INJECTION at 12:44:00

## 2024-12-26 RX ADMIN — SCOLOPAMINE TRANSDERMAL SYSTEM 1 PATCH: 1 PATCH, EXTENDED RELEASE TRANSDERMAL at 12:22:00

## 2024-12-26 RX ADMIN — SODIUM CHLORIDE, SODIUM LACTATE, POTASSIUM CHLORIDE, CALCIUM CHLORIDE: 600; 310; 30; 20 INJECTION, SOLUTION INTRAVENOUS at 14:02:00

## 2024-12-26 RX ADMIN — ONDANSETRON 4 MG: 2 INJECTION INTRAMUSCULAR; INTRAVENOUS at 13:26:00

## 2024-12-26 RX ADMIN — KETOROLAC TROMETHAMINE 30 MG: 30 INJECTION, SOLUTION INTRAMUSCULAR; INTRAVENOUS at 13:26:00

## 2024-12-26 RX ADMIN — ACETAMINOPHEN 1000 MG: 10 INJECTION, SOLUTION INTRAVENOUS at 13:26:00

## 2024-12-26 RX ADMIN — LIDOCAINE HYDROCHLORIDE 4 ML: 40 INJECTION, SOLUTION RETROBULBAR at 12:42:00

## 2024-12-26 RX ADMIN — SODIUM CHLORIDE, SODIUM LACTATE, POTASSIUM CHLORIDE, CALCIUM CHLORIDE: 600; 310; 30; 20 INJECTION, SOLUTION INTRAVENOUS at 12:37:00

## 2024-12-26 RX ADMIN — LIDOCAINE HYDROCHLORIDE 50 MG: 10 INJECTION, SOLUTION EPIDURAL; INFILTRATION; INTRACAUDAL; PERINEURAL at 12:40:00

## 2024-12-26 NOTE — DISCHARGE INSTRUCTIONS
No heavy lifting (>10-15 lbs), no strenuous activity and pelvic rest X 2 weeks  No driving until not taking narcotic medication (usually at least 1 week)  Remove skin glue in 2 weeks  You may shower  Walk around daily

## 2024-12-26 NOTE — ANESTHESIA PROCEDURE NOTES
Airway  Date/Time: 12/26/2024 12:43 PM  Urgency: elective      General Information and Staff    Patient location during procedure: OR  Anesthesiologist: Kimberly Fong MD  Resident/CRNA: Roe Washburn CRNA  Performed: CRNA   Performed by: Roe Washburn CRNA  Authorized by: Kimberly Fong MD      Indications and Patient Condition  Indications for airway management: anesthesia  Sedation level: deep  Preoxygenated: yes  Patient position: sniffing  Mask difficulty assessment: 1 - vent by mask    Final Airway Details  Final airway type: endotracheal airway      Successful airway: ETT  Cuffed: yes   Successful intubation technique: direct laryngoscopy  Endotracheal tube insertion site: oral  Blade: Augustin  Blade size: #4  ETT size (mm): 7.0    Cormack-Lehane Classification: grade I - full view of glottis  Placement verified by: capnometry   Measured from: lips  ETT to lips (cm): 21  Number of attempts at approach: 1

## 2024-12-26 NOTE — INTERVAL H&P NOTE
Pre-op Diagnosis: ENDOMETRIOSIS, SEPTUM    The above referenced H&P was reviewed by Rosmery Parry MD on 12/26/2024, the patient was examined and no significant changes have occurred in the patient's condition since the H&P was performed.  I discussed with the patient and/or legal representative the potential benefits, risks and side effects of this procedure; the likelihood of the patient achieving goals; and potential problems that might occur during recuperation.  I discussed reasonable alternatives to the procedure, including risks, benefits and side effects related to the alternatives and risks related to not receiving this procedure.  We will proceed with procedure as planned.

## 2024-12-26 NOTE — ANESTHESIA POSTPROCEDURE EVALUATION
[facility]    Carissa Ralph Patient Status:  Hospital Outpatient Surgery   Age/Gender 29 year old female MRN [id number]   Location [facility] POST ANESTHESIA CARE UNIT Attending Rosmery Parry MD   Hosp Day # 0 PCP Janey Moran DO       Anesthesia Post-op Note    HYSTEROSCOPY, TRANSECTION OF INTRAUTERINE SEPTUM, PLACEMENT OF STENT,LAPAROSCOPY, TUBAL LAVAGE, EXCISION OF ENDOMETRIOSIS,    Procedure Summary       Date: 12/26/24 Room / Location:  MAIN OR 14 /  MAIN OR    Anesthesia Start: 1234 Anesthesia Stop: 1402    Procedure: HYSTEROSCOPY, TRANSECTION OF INTRAUTERINE SEPTUM, PLACEMENT OF STENT,LAPAROSCOPY, TUBAL LAVAGE, EXCISION OF ENDOMETRIOSIS, (Abdomen) Diagnosis: (ENDOMETRIOSIS, SEPTUM)    Surgeons: Rosmery Parry MD Anesthesiologist: Kimberly Fong MD    Anesthesia Type: general ASA Status: 1            Anesthesia Type: general    Vitals Value Taken Time   /89 12/26/24 1406   Temp 97.9 12/26/24 1406   Pulse 88 12/26/24 1406   Resp 18 12/26/24 1406   SpO2 98% 12/26/24 1406       Patient Location: PACU    Anesthesia Type: general    Airway Patency: patent    Postop Pain Control: adequate    Mental Status: mildly sedated but able to meaningfully participate in the post-anesthesia evaluation    Nausea/Vomiting: none    Cardiopulmonary/Hydration status: stable euvolemic    Complications: no apparent anesthesia related complications    Postop vital signs: stable    Dental Exam: Unchanged from Preop    Patient to be discharged from PACU when criteria met.

## 2024-12-26 NOTE — OPERATIVE REPORT
PREOPERATIVE DIAGNOSIS:  Pelvic Pain, Uterine septum    POSTOPERATIVE DIAGNOSIS:  Same as above, endometriosis   PROCEDURE PERFORMED:  Hysteroscopy, transection of uterine septum, laparoscopy, tubal lavage, excision of endometriosis, placement of uterine stent    ASSISTANT:  NIGEL Ledezma    ANESTHESIA:  General.    COMPLICATIONS:  None.    ESTIMATED BLOOD LOSS:  5 mL.    SPECIMENS: Endometriosis  Peritoneal cyst     DISPOSITION:  Stable to recovery room.    FINDINGS:  Grossly normal liver, gallbladder, bilateral tubes and ovaries.      Uterine cavity on hysteroscopy with small uterine septum  Bilateral tubal ostia visualized  Stage 1 endometriosis on left pelvic sidewall  Patent bilateral fallopian tubes     PROCEDURE:  The patient was taken back to the operating room with the IV running.  Once general anesthesia was placed and found to be adequate, patient was then placed in the dorsal lithotomy position.  The patient was then prepped and draped in the usual sterile fashion.  A Hamlin catheter was then placed in the bladder and noted to be draining clear yellow urine.  A weighted speculum was placed in the vagina and the cervix was visualized with a right angle retractor. A single tooth tenaculum was placed on the anterior lip of the uterine cervix and it was dilated to a 4 hegar dilator.  At this time, a small caliber hysteroscope was then introduced into cervical os and introduced in the uterine cavity.  The uterine cavity had a small uterine septum and it was transected with scissors until both ostia were on the same level. Therefore, the hysteroscope was then withdrawn.   The uterus sounded to approximately 7 cm.  A [employer] uterine manipulator was introduced into the uterine cavity and attached to the single-tooth tenaculum.      Attention was then turned to the patient's abdomen where the base of the umbilicus was grasped with a Kocher clamp and everted.  Two towel clips were placed periumbilically.   Sensorcaine 0.5% injected and a 5 mm skin incision made.  While tenting the abdominal wall with the towel clips, the Veress needle was introduced into the abdominal cavity where intraabdominal placement was confirmed by a drop of intraabdominal pressure while insufflating with CO2 gas.  Once adequate pneumoperitoneum was obtained, the 5mm trocar and sleeve were then introduced into the abdominal cavity where intraabdominal placement was confirmed with a laparoscope.  A survey of the patient's upper abdomen and pelvis revealed the above-mentioned findings.  The patient was placed in Trendelenburg position to help pack away the bowel into the upper abdomen.  The inferior epigastric vessels were identified in the bilateral lower quadrants.  Lateral to these vessels, 0.5% Sensorcaine was injected, 5-mm incisions made and 5-mm trocar and sleeves introduced under direct visualization without any difficulty.     A tubal lavage was performed and patent fallopian tubes were seen bilaterally.  At this time the pelvis was closely evaluated and a peritoneal cyst was noted and removed.  Now endometriosis was excised from the left pelvic sidewall  and sent to pathology.  At this time a low pressure check was performed to a pressure of 5mm Hg and good hemostasis was noted.      Now while the my assistant was looking with the laparoscope and the stent was placed and injected with 6 ml of sterile water. All instruments and CO2 were removed from the abdomen and all skin incisions were approximated with 4-0 Monocryl and Steri-Strips and Tegaderm placed over the umbilical incision.  The vaginal instrumentation was removed from the patient's vagina.  No active bleeding noted from the tenaculum site.  Hamlin catheter removed at the end of procedure.  The patient tolerated the procedure well.    All sponge, lap, needle and instrument counts were correct x2.    The patient was taken to recovery room awake and in stable condition.

## 2024-12-26 NOTE — ANESTHESIA PREPROCEDURE EVALUATION
PRE-OP EVALUATION    Patient Name: Carissa Ralph    Admit Diagnosis: ENDOMETRIOSIS, SEPTUM    Pre-op Diagnosis: ENDOMETRIOSIS, SEPTUM    HYSTEROSCOPY, TRANSECTION OF INTRAUTERINE SEPTUM, PLACEMENT OF STENT,LAPAROSCOPY, TUBAL LAVAGE, EXCISION OF ENDOMETRIOSIS, POSSIBLE LEFT SALPINGECTOMY, CYSTOSCOPY    Anesthesia Procedure: HYSTEROSCOPY, TRANSECTION OF INTRAUTERINE SEPTUM, PLACEMENT OF STENT,LAPAROSCOPY, TUBAL LAVAGE, EXCISION OF ENDOMETRIOSIS, POSSIBLE LEFT SALPINGECTOMY, CYSTOSCOPY (Left)    Surgeons and Role:     * Rosmery Parry MD - Primary    Pre-op vitals reviewed.  Temp: 97.8 °F (36.6 °C)  Pulse: 75  Resp: 17  BP: 139/81  SpO2: 100 %  Body mass index is 30.44 kg/m².    Current medications reviewed.  Hospital Medications:   [Transfer Hold] acetaminophen (Tylenol Extra Strength) tab 1,000 mg  1,000 mg Oral Once    [Transfer Hold] scopolamine (Transderm-Scop) 1 MG/3DAYS patch 1 patch  1 patch Transdermal Once    lactated ringers infusion   Intravenous Continuous    ceFAZolin (Ancef) 2g in 10mL IV syringe premix  2 g Intravenous Once    [COMPLETED] phenazopyridine (Pyridum) tab 200 mg  200 mg Oral Once       Outpatient Medications:   Prescriptions Prior to Admission[1]    Allergies: Bactrim      Anesthesia Evaluation    Patient summary reviewed.    Anesthetic Complications  (-) history of anesthetic complications         GI/Hepatic/Renal    Negative GI/hepatic/renal ROS.                             Cardiovascular    Negative cardiovascular ROS.                                                   Endo/Other    Negative endo/other ROS.                              Pulmonary    Negative pulmonary ROS.                       Neuro/Psych    Negative neuro/psych ROS.                                  Past Surgical History:   Procedure Laterality Date    Cholecystectomy  8-18-11 [facility]    Cholecystitis w/cholelithiasis     Social History     Socioeconomic History    Marital status: Single   Tobacco Use    Smoking  status: Never    Smokeless tobacco: Never   Substance and Sexual Activity    Alcohol use: Yes     Comment: occasioanlly    Drug use: No    Sexual activity: Yes     Partners: Male     Birth control/protection: Condom     History   Drug Use No     Available pre-op labs reviewed.               Airway      Mallampati: I  Mouth opening: >3 FB  TM distance: > 6 cm  Neck ROM: full Cardiovascular    Cardiovascular exam normal.  Rhythm: regular  Rate: normal     Dental    Dentition appears grossly intact         Pulmonary    Pulmonary exam normal.                 Other findings              ASA: 1   Plan: general  NPO status verified and patient meets guidelines.    Post-procedure pain management plan discussed with surgeon and patient.    Comment: Options, risks and benefits of anesthesia as outlined in the anesthesia consent were reviewed with the patient. Risks and benefits of GA including sore throat, allergy, nausea, vomiting, dental trauma, pain management modalities were all discussed. Particularly the risk of dental trauma with weakened teeth or crowns, partials, fillings and any non natural teeth due to instrumentation of oral cavity and airway. Patient understands risks and verbally agreed to proceed. All questions answered.The consent was signed without further questions.        Plan/risks discussed with: patient                Present on Admission:  **None**             [1]   Medications Prior to Admission   Medication Sig Dispense Refill Last Dose/Taking    MAGNESIUM OR Take by mouth As Directed.   Past Month    PROBIOTIC PRODUCT OR Take by mouth As Directed.   Past Month    triamcinolone 0.1 % External Cream Apply to AA on body bid prn with flares   Past Month    Cholecalciferol (VITAMIN D) 50 MCG (2000 UT) Oral Cap Take by mouth As Directed.   Past Month    ketoconazole 2 % External Cream Apply 1 Application topically 2 (two) times daily. (Patient not taking: Reported on 12/10/2024) 30 g 1 Not Taking

## (undated) DEVICE — ADHESIVE LIQ 2/3ML VI MASTISOL

## (undated) DEVICE — SUT MCRYL 4-0 18IN PS-2 ABSRB UD 19MM 3/8 CIR

## (undated) DEVICE — [HIGH FLOW INSUFFLATOR,  DO NOT USE IF PACKAGE IS DAMAGED,  KEEP DRY,  KEEP AWAY FROM SUNLIGHT,  PROTECT FROM HEAT AND RADIOACTIVE SOURCES.]: Brand: PNEUMOSURE

## (undated) DEVICE — GLOVE SUR 6 SENSICARE PI PIP CRM PWD F

## (undated) DEVICE — TROCAR: Brand: KII® SLEEVE

## (undated) DEVICE — Device

## (undated) DEVICE — TROCAR: Brand: KII FIOS FIRST ENTRY

## (undated) DEVICE — LAPAROVUE VISIBILITY SYSTEM LAPAROSCOPIC SOLUTIONS: Brand: LAPAROVUE

## (undated) DEVICE — LACTATED. R IRRIG 3000ML

## (undated) DEVICE — GLOVE SUR 6.5 SENSICARE NEOPR PWD F

## (undated) DEVICE — ENDOPATH 5MM CURVED SCISSORS WITH MONOPOLAR CAUTERY: Brand: ENDOPATH

## (undated) DEVICE — SHEARS: Brand: ENDO MINI-SHEARS

## (undated) DEVICE — SLEEVE COMPR MD KNEE LEN SGL USE KENDALL SCD

## (undated) DEVICE — PLUMEPORT ACTIV LAPAROSCOPIC SMOKE FILTRATION DEVICE: Brand: PLUMEPORT ACTIVE

## (undated) DEVICE — SOLUTION IRRIG 1000ML 0.9% NACL USP BTL

## (undated) DEVICE — 3M™ STERI-STRIP™ REINFORCED ADHESIVE SKIN CLOSURES, R1547, 1/2 IN X 4 IN (12 MM X 100 MM), 6 STRIPS/ENVELOPE: Brand: 3M™ STERI-STRIP™

## (undated) DEVICE — 40580 - THE PINK PAD - ADVANCED TRENDELENBURG POSITIONING KIT: Brand: 40580 - THE PINK PAD - ADVANCED TRENDELENBURG POSITIONING KIT

## (undated) DEVICE — GYN LAP/ROBOTIC: Brand: MEDLINE INDUSTRIES, INC.

## (undated) DEVICE — SOLUTION IRRIG 3000ML 0.9% NACL FLX CONT

## (undated) DEVICE — 3M™ TEGADERM™ +PAD FILM DRESSING WITH NON-ADHERENT PAD, 3587, 3-1/2 IN X 4-1/8 IN (9 CM X 10.5 CM), 25/CAR, 4 CAR/CS: Brand: 3M™ TEGADERM™

## (undated) DEVICE — SOLUTION PREP 26ML 0.7% POVACRYLEX 74% ISO

## (undated) DEVICE — INSUFFLATION NEEDLE TO ESTABLISH PNEUMOPERITONEUM.: Brand: INSUFFLATION NEEDLE

## (undated) NOTE — LETTER
OUTSIDE TESTING RESULT REQUEST     IMPORTANT: FOR YOUR IMMEDIATE ATTENTION  Please FAX all test results listed below to: [phone]     Testing already done on or about: 2024     * * * * If testing is NOT complete, arrange with patient A.S.A.P. * * * *      Patient Name: Carissa Ralph  Surgery Date: 2024  Medical Record: [id number]  CSN: [id number]  : 1995 - A: 29 y     Sex: female  Surgeon(s):  Rosmery Parry MD  Procedure: HYSTEROSCOPY, TRANSECTION OF INTRAUTERINE SEPTUM, PLACEMENT OF STENT,LAPAROSCOPY, TUBAL LAVAGE, EXCISION OF ENDOMETRIOSIS, POSSIBLE LEFT SALPINGECTOMY, CYSTOSCOPY  Anesthesia Type: General     Surgeon: Rosmery Parry MD     The following Testing and Time Line are REQUIRED PER ANESTHESIA     CMP (requires 4 hour fast) within  90 days      Thank You,   Sent by: Kassie CARLISLE

## (undated) NOTE — LETTER
OUTSIDE TESTING RESULT REQUEST     IMPORTANT: FOR YOUR IMMEDIATE ATTENTION  Please FAX all test results listed below to: [phone]     Testing already done on or about: 2024     * * * * If testing is NOT complete, arrange with patient A.S.A.P. * * * *      Patient Name: Carissa Ralph  Surgery Date: 2024  Medical Record: [id number]  CSN: [id number]  : 1995 - A: 29 y     Sex: female  Surgeon(s):  Rosmery Parry MD  Procedure: HYSTEROSCOPY, TRANSECTION OF INTRAUTERINE SEPTUM, PLACEMENT OF STENT,LAPAROSCOPY, TUBAL LAVAGE, EXCISION OF ENDOMETRIOSIS, POSSIBLE LEFT SALPINGECTOMY, CYSTOSCOPY  Anesthesia Type: General     Surgeon: Rosmery Parry MD     The following Testing and Time Line are REQUIRED PER ANESTHESIA     {EDW PAT SENDOUT:7907}      Thank You,   Sent by:***

## (undated) NOTE — LETTER
OUTSIDE TESTING RESULT REQUEST     IMPORTANT: FOR YOUR IMMEDIATE ATTENTION  Please FAX all test results listed below to: [phone]     Testing already done on or about: 2024     * * * * If testing is NOT complete, arrange with patient A.S.A.P. * * * *      Patient Name: Carissa Ralph  Surgery Date: 2024  Medical Record: [id number]  CSN: [id number]  : 1995 - A: 29 y     Sex: female  Surgeon(s):  Rosmery Parry MD  Procedure: HYSTEROSCOPY, TRANSECTION OF INTRAUTERINE SEPTUM, PLACEMENT OF STENT,LAPAROSCOPY, TUBAL LAVAGE, EXCISION OF ENDOMETRIOSIS, POSSIBLE LEFT SALPINGECTOMY, CYSTOSCOPY  Anesthesia Type: General     Surgeon: Rosmery Parry MD     The following Testing and Time Line are REQUIRED PER ANESTHESIA           Thank You,   Sent by:***